# Patient Record
Sex: MALE | Race: WHITE | NOT HISPANIC OR LATINO | Employment: FULL TIME | URBAN - METROPOLITAN AREA
[De-identification: names, ages, dates, MRNs, and addresses within clinical notes are randomized per-mention and may not be internally consistent; named-entity substitution may affect disease eponyms.]

---

## 2021-12-02 ENCOUNTER — OFFICE VISIT (OUTPATIENT)
Dept: URGENT CARE | Facility: CLINIC | Age: 31
End: 2021-12-02
Payer: COMMERCIAL

## 2021-12-02 ENCOUNTER — OCCMED (OUTPATIENT)
Dept: URGENT CARE | Facility: CLINIC | Age: 31
End: 2021-12-02

## 2021-12-02 VITALS
DIASTOLIC BLOOD PRESSURE: 79 MMHG | HEART RATE: 81 BPM | BODY MASS INDEX: 20.32 KG/M2 | HEIGHT: 72 IN | OXYGEN SATURATION: 98 % | TEMPERATURE: 100.1 F | SYSTOLIC BLOOD PRESSURE: 129 MMHG | RESPIRATION RATE: 18 BRPM | WEIGHT: 150 LBS

## 2021-12-02 DIAGNOSIS — H60.391 OTHER INFECTIVE ACUTE OTITIS EXTERNA OF RIGHT EAR: ICD-10-CM
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
## 2021-12-02 DIAGNOSIS — R68.89 FLU-LIKE SYMPTOMS: Primary | ICD-10-CM

## 2021-12-02 DIAGNOSIS — S06.0X0A CONCUSSION WITHOUT LOSS OF CONSCIOUSNESS, INITIAL ENCOUNTER: ICD-10-CM

## 2021-12-02 DIAGNOSIS — H61.21 IMPACTED CERUMEN OF RIGHT EAR: ICD-10-CM

## 2021-12-02 PROCEDURE — S9083 URGENT CARE CENTER GLOBAL: HCPCS | Performed by: PHYSICIAN ASSISTANT

## 2021-12-02 PROCEDURE — 0241U HB NFCT DS VIR RESP RNA 4 TRGT: CPT | Performed by: PHYSICIAN ASSISTANT

## 2021-12-02 PROCEDURE — G0383 LEV 4 HOSP TYPE B ED VISIT: HCPCS | Performed by: PHYSICIAN ASSISTANT

## 2021-12-02 PROCEDURE — 99213 OFFICE O/P EST LOW 20 MIN: CPT | Performed by: PHYSICIAN ASSISTANT

## 2021-12-02 PROCEDURE — 69209 REMOVE IMPACTED EAR WAX UNI: CPT | Performed by: PHYSICIAN ASSISTANT

## 2021-12-02 RX ORDER — OFLOXACIN 3 MG/ML
10 SOLUTION AURICULAR (OTIC) DAILY
Qty: 5 ML | Refills: 0 | Status: SHIPPED | OUTPATIENT
Start: 2021-12-02 | End: 2021-12-09

## 2021-12-03 LAB
FLUAV RNA RESP QL NAA+PROBE: NEGATIVE
FLUBV RNA RESP QL NAA+PROBE: NEGATIVE
RSV RNA RESP QL NAA+PROBE: NEGATIVE
SARS-COV-2 RNA RESP QL NAA+PROBE: POSITIVE

## 2021-12-07 ENCOUNTER — TELEPHONE (OUTPATIENT)
Dept: URGENT CARE | Facility: CLINIC | Age: 31
End: 2021-12-07

## 2022-08-23 ENCOUNTER — OFFICE VISIT (OUTPATIENT)
Dept: URGENT CARE | Facility: CLINIC | Age: 32
End: 2022-08-23
Payer: COMMERCIAL

## 2022-08-23 VITALS
WEIGHT: 162 LBS | SYSTOLIC BLOOD PRESSURE: 115 MMHG | OXYGEN SATURATION: 99 % | BODY MASS INDEX: 21.94 KG/M2 | HEIGHT: 72 IN | HEART RATE: 80 BPM | DIASTOLIC BLOOD PRESSURE: 73 MMHG | RESPIRATION RATE: 16 BRPM | TEMPERATURE: 97.8 F

## 2022-08-23 DIAGNOSIS — Z77.29 EXPOSURE TO RODENT: ICD-10-CM

## 2022-08-23 DIAGNOSIS — R11.11 VOMITING WITHOUT NAUSEA, UNSPECIFIED VOMITING TYPE: ICD-10-CM

## 2022-08-23 DIAGNOSIS — L84 CORN OF FOOT: ICD-10-CM

## 2022-08-23 DIAGNOSIS — B34.9 VIRAL SYNDROME: Primary | ICD-10-CM

## 2022-08-23 LAB
SARS-COV-2 AG UPPER RESP QL IA: NEGATIVE
VALID CONTROL: NORMAL

## 2022-08-23 PROCEDURE — 99213 OFFICE O/P EST LOW 20 MIN: CPT | Performed by: PHYSICIAN ASSISTANT

## 2022-08-23 PROCEDURE — 87811 SARS-COV-2 COVID19 W/OPTIC: CPT | Performed by: PHYSICIAN ASSISTANT

## 2022-08-23 NOTE — PROGRESS NOTES
[facility]        NAME: Sarah Peng is a 28 y o  male  : 1990    MRN: [id number]  DATE: 2022  TIME: 3:14 PM    Assessment and Plan   Viral syndrome [B34 9]  1  Viral syndrome  Poct Covid 19 Rapid Antigen Test   2  Vomiting without nausea, unspecified vomiting type     3  Exposure to rodent     4  [place] of foot  Ambulatory Referral to Podiatry         Patient Instructions   Drink plenty of fluids  Tylenol ibuprofen  Over-the-counter cold and flu medications  Go directly to the ER if symptoms worsen  Make an appoint with PCP  Make appoint with Podiatry  Follow up with PCP in 3-5 days  Proceed to  ER if symptoms worsen  Chief Complaint     Chief Complaint   Patient presents with    Cough     Has a cough x 2 days and has some sob  Worried that he got a virus from a mouse because he got mouse poop in left eye 1 week ago         History of Present Illness       Patient is a 62-year-old male with no significant past medical history presents the office complaining of fatigue, body aches, cough, and dyspnea on exertion for a few days  Denies fevers, chills, nasal congestion, sore throat, chest pain, abdominal pain, or rashes  States he was cleaning and mouse feces dropped into his eye last week  Reports he did research online about hand to virus and is concerned that he now has this  He went to the ER same day but did not have symptoms with a sent him home  They told him to use topical antibiotic eye ointment which she used for 2 days only  Patient admits to vomiting but states this is been a normal occurrence for him for approximately 3 years  States he usually vomits once in the morning a soon as he stands up  States does not matter what time it is but occurs once he sits upright  States he also vomit when he stands up after lying on the couch for a long period of time    Denies associated headache, dizziness, diaphoresis, vision changes, chest pain, shortness breast, or numbness and tingling  Admits he drank heavily for a long period time but has not done so in a few months  Patient does smoke and use marijuana occasionally  He has never been seen for this before  Patient is also complaining of hard lump between digits 4 and 5 on his right foot for long time  He tried putting a Band-Aid around his toe which did help for a while but now it is not helping  Review of Systems   Review of Systems   Constitutional: Negative for chills and fever  HENT: Positive for postnasal drip  Negative for congestion, sore throat and trouble swallowing  Eyes: Negative for photophobia, pain, discharge, redness, itching and visual disturbance  Respiratory: Positive for cough and shortness of breath  Negative for wheezing  Cardiovascular: Negative for chest pain and palpitations  Gastrointestinal: Positive for nausea and vomiting  Negative for abdominal pain and diarrhea  Musculoskeletal: Positive for myalgias  Skin: Negative for rash  Neurological: Positive for headaches  Negative for dizziness and light-headedness  Current Medications     No current outpatient medications on file  Current Allergies     Allergies as of 08/23/2022 - Reviewed 08/23/2022   Allergen Reaction Noted    Neomycin Other (See Comments) 06/19/2013            The following portions of the patient's history were reviewed and updated as appropriate: allergies, current medications, past family history, past medical history, past social history, past surgical history and problem list      Past Medical History:   Diagnosis Date    Neck fracture ([facility] [address] )        Past Surgical History:   Procedure Laterality Date    NO PAST SURGERIES         Family History   Problem Relation Age of Onset    No Known Problems Mother     No Known Problems Father          Medications have been verified          Objective   /73   Pulse 80   Temp 97 8 °F (36 6 °C)   Resp 16   Ht 6' (1 829 m)   Wt 73 5 kg (162 lb)   SpO2 99%   BMI 21 97 kg/m²   No LMP for male patient  Physical Exam     Physical Exam  Vitals and nursing note reviewed  Constitutional:       Appearance: Normal appearance  He is well-developed  He is not ill-appearing  Comments: Patient resting comfortably on examination table no acute distress  Speaking full sentences without difficulty  HENT:      Head: Normocephalic and atraumatic  Right Ear: Tympanic membrane, ear canal and external ear normal       Left Ear: Tympanic membrane, ear canal and external ear normal       Nose: Nose normal       Mouth/Throat:      Pharynx: Uvula midline  Eyes:      General: Lids are normal       Conjunctiva/sclera: Conjunctivae normal       Pupils: Pupils are equal, round, and reactive to light  Cardiovascular:      Rate and Rhythm: Normal rate and regular rhythm  Heart sounds: Normal heart sounds  No murmur heard  No friction rub  No gallop  Pulmonary:      Effort: Pulmonary effort is normal       Breath sounds: Normal breath sounds  No stridor  No wheezing or rales  Musculoskeletal:         General: Normal range of motion  Cervical back: Neck supple  Skin:     General: Skin is warm and dry  Capillary Refill: Capillary refill takes less than 2 seconds  Comments: 0 5 cm round area of hyperkeritinzed skin on 4th digit of right foot  TTP  Neurological:      Mental Status: He is alert         POC rapid COVID-19 negative

## 2023-03-28 ENCOUNTER — OFFICE VISIT (OUTPATIENT)
Dept: URGENT CARE | Facility: CLINIC | Age: 33
End: 2023-03-28

## 2023-03-28 VITALS
SYSTOLIC BLOOD PRESSURE: 106 MMHG | BODY MASS INDEX: 20.99 KG/M2 | WEIGHT: 155 LBS | TEMPERATURE: 97.8 F | OXYGEN SATURATION: 99 % | HEIGHT: 72 IN | HEART RATE: 69 BPM | RESPIRATION RATE: 18 BRPM | DIASTOLIC BLOOD PRESSURE: 70 MMHG

## 2023-03-28 DIAGNOSIS — K64.9 HEMORRHOIDS, UNSPECIFIED HEMORRHOID TYPE: Primary | ICD-10-CM

## 2023-03-28 NOTE — LETTER
March 28, 2023     Patient: Kathryn Gabriel   YOB: 1990   Date of Visit: 3/28/2023       To Whom it May Concern:    Kathryn Gabriel was seen in my clinic on 3/28/2023  He may return to work on 3/30/2023  Please excuse any missed time due to injury  If you have any questions or concerns, please don't hesitate to call           Sincerely,          STEPHANIE Tsai        CC: No Recipients

## 2023-03-29 NOTE — PATIENT INSTRUCTIONS
Use medication as prescribed  Warm water soaks and baths  Avoid straining during bowel movements   OTC Tylenol or Ibuprofen for pain   Referral placed to Colorectal Surgery for removal   Follow up with PCP in 3-5 days  Proceed to ER if symptoms worsen  Hemorrhoids   AMBULATORY CARE:   Hemorrhoids  are swollen blood vessels inside your rectum (internal hemorrhoids) or on your anus (external hemorrhoids)  Sometimes a hemorrhoid may prolapse  This means it extends out of your anus  Common symptoms include the following:   Pain or itching around your anus or inside your rectum    Swelling or bumps around your anus    Bright red blood in your bowel movement, on the toilet paper, or in the toilet bowl    Tissue bulging out of your anus (prolapsed hemorrhoids)    Incontinence (poor control over urine or bowel movements)    Seek care immediately if:   You have severe pain in your rectum or around your anus  You have severe pain in your abdomen and you are vomiting  You have bleeding from your anus that soaks through your underwear  Contact your healthcare provider if:   You have frequent and painful bowel movements  Your hemorrhoid looks or feels more swollen than usual      You do not have a bowel movement for 2 days or more  You see or feel tissue coming through your anus  You have questions or concerns about your condition or care  Treatment for hemorrhoids  may include medicines to decrease pain, swelling, and itching  The medicine may be a pill, pad, cream, or ointment  Medicine may also be given to soften your bowel movement  Surgery and other procedures may be needed to shrink or remove your hemorrhoids  Manage your symptoms:   Apply ice on your anus for 15 to 20 minutes every hour or as directed  Use an ice pack, or put crushed ice in a plastic bag  Cover it with a towel before you apply it to your anus  Ice helps prevent tissue damage and decreases swelling and pain      Take a sitz Sherlynn Belt a bathtub with 4 to 6 inches of warm water  You may also use a sitz bath pan that fits inside a toilet bowl  Sit in the sitz bath for 15 minutes  Do this 3 times a day, and after each bowel movement  The warm water can help decrease pain and swelling  Keep your anal area clean  Gently wash the area with warm water daily  Soap may irritate the area  After a bowel movement, wipe with moist towelettes or wet toilet paper  Dry toilet paper can irritate the area  Prevent hemorrhoids:   Do not strain to have a bowel movement  Do not sit on the toilet too long  These actions can increase pressure on the tissues in your rectum and anus  Drink plenty of liquids  Liquids can help prevent constipation  Ask how much liquid to drink each day and which liquids are best for you  Eat a variety of high-fiber foods  Examples include fruits, vegetables, and whole grains  Ask your healthcare provider how much fiber you need each day  You may need to take a fiber supplement  Exercise as directed  Exercise, such as walking, may make it easier to have a bowel movement  Ask your healthcare provider to help you create an exercise plan  Do not have anal sex  Anal sex can weaken the skin around your rectum and anus  Avoid heavy lifting  This can cause straining and increase your risk for another hemorrhoid  Follow up with your doctor as directed:  Write down your questions so you remember to ask them during your visits  © Copyright [employer] 2022 Information is for End User's use only and may not be sold, redistributed or otherwise used for commercial purposes  The above information is an  only  It is not intended as medical advice for individual conditions or treatments  Talk to your doctor, nurse or pharmacist before following any medical regimen to see if it is safe and effective for you

## 2023-03-29 NOTE — PROGRESS NOTES
[facility]        NAME: Mima Poster is a 33 y o  male  : 1990    MRN: [id number]  DATE: 2023  TIME: 8:17 PM    Assessment and Plan   Hemorrhoids, unspecified hemorrhoid type [K64 9]  1  Hemorrhoids, unspecified hemorrhoid type  hydrocortisone-pramoxine (PROCTOFOAM-HC) 1-1 % FOAM rectal foam    Ambulatory Referral to Colorectal Surgery        Rectum observed with chaperone present and patient permission, external hemorrhoid visualized  Improved from photos on cell phone 2 days prior  Proctofoam prescribed and referral placed to colorectal surgery  Encouraged continued supportive measures  Follow up with PCP in 3-5 days or proceed to emergency department for worsening symptoms  Patient verbalized understanding of instructions given  Patient Instructions     Patient Instructions     Use medication as prescribed  Warm water soaks and baths  Avoid straining during bowel movements   OTC Tylenol or Ibuprofen for pain   Referral placed to Colorectal Surgery for removal   Follow up with PCP in 3-5 days  Proceed to ER if symptoms worsen  Hemorrhoids   AMBULATORY CARE:   Hemorrhoids  are swollen blood vessels inside your rectum (internal hemorrhoids) or on your anus (external hemorrhoids)  Sometimes a hemorrhoid may prolapse  This means it extends out of your anus  Common symptoms include the following:   • Pain or itching around your anus or inside your rectum    • Swelling or bumps around your anus    • Bright red blood in your bowel movement, on the toilet paper, or in the toilet bowl    • Tissue bulging out of your anus (prolapsed hemorrhoids)    • Incontinence (poor control over urine or bowel movements)    Seek care immediately if:   • You have severe pain in your rectum or around your anus  • You have severe pain in your abdomen and you are vomiting  • You have bleeding from your anus that soaks through your underwear      Contact your healthcare provider if:   • You have frequent and painful bowel movements  • Your hemorrhoid looks or feels more swollen than usual      • You do not have a bowel movement for 2 days or more  • You see or feel tissue coming through your anus  • You have questions or concerns about your condition or care  Treatment for hemorrhoids  may include medicines to decrease pain, swelling, and itching  The medicine may be a pill, pad, cream, or ointment  Medicine may also be given to soften your bowel movement  Surgery and other procedures may be needed to shrink or remove your hemorrhoids  Manage your symptoms:   • Apply ice on your anus for 15 to 20 minutes every hour or as directed  Use an ice pack, or put crushed ice in a plastic bag  Cover it with a towel before you apply it to your anus  Ice helps prevent tissue damage and decreases swelling and pain  • Take a sitz bath  Fill a bathtub with 4 to 6 inches of warm water  You may also use a sitz bath pan that fits inside a toilet bowl  Sit in the sitz bath for 15 minutes  Do this 3 times a day, and after each bowel movement  The warm water can help decrease pain and swelling  • Keep your anal area clean  Gently wash the area with warm water daily  Soap may irritate the area  After a bowel movement, wipe with moist towelettes or wet toilet paper  Dry toilet paper can irritate the area  Prevent hemorrhoids:   • Do not strain to have a bowel movement  Do not sit on the toilet too long  These actions can increase pressure on the tissues in your rectum and anus  • Drink plenty of liquids  Liquids can help prevent constipation  Ask how much liquid to drink each day and which liquids are best for you  • Eat a variety of high-fiber foods  Examples include fruits, vegetables, and whole grains  Ask your healthcare provider how much fiber you need each day  You may need to take a fiber supplement  • Exercise as directed    Exercise, such as walking, may make it easier to have a bowel movement  Ask your healthcare provider to help you create an exercise plan  • Do not have anal sex  Anal sex can weaken the skin around your rectum and anus  • Avoid heavy lifting  This can cause straining and increase your risk for another hemorrhoid  Follow up with your doctor as directed:  Write down your questions so you remember to ask them during your visits  © Copyright [employer] 2022 Information is for End User's use only and may not be sold, redistributed or otherwise used for commercial purposes  The above information is an  only  It is not intended as medical advice for individual conditions or treatments  Talk to your doctor, nurse or pharmacist before following any medical regimen to see if it is safe and effective for you  Chief Complaint     Chief Complaint   Patient presents with   • Hemorrhoids     2 days ago he lifted heavy object and developed hemorroid  History of Present Illness       80-year-old male presents with complaints of hemorrhoid x3 days  Patient states that he was lifting a object about 2 days ago when he noticed increased pain and bulging from his rectum  Patient reports history of hemorrhoids in the past, often triggered and exacerbated by heavy lifting  He states he has been applying Preparation H, sugar, and soaking in a warm bath with improvement of his symptoms  Denies any rectal bleeding or urinary symptoms  Last BM today  He states significant decrease in swelling as compared to initial injury  Patient did take photos of his hemorrhoid on his cell phone to show during patient encounter for comparison  Review of Systems   Review of Systems   Constitutional: Negative for fever  Respiratory: Negative for shortness of breath  Cardiovascular: Negative for chest pain  Gastrointestinal: Positive for rectal pain  Negative for abdominal pain, anal bleeding, blood in stool, diarrhea, nausea and vomiting  Hemorrhoids   Genitourinary: Negative for difficulty urinating  Skin: Negative for rash  Current Medications       Current Outpatient Medications:   •  hydrocortisone-pramoxine (PROCTOFOAM-HC) 1-1 % FOAM rectal foam, Insert 1 applicator into the rectum 2 (two) times a day, Disp: 10 g, Rfl: 0    Current Allergies     Allergies as of 03/28/2023 - Reviewed 03/28/2023   Allergen Reaction Noted   • Neomycin Other (See Comments) 06/19/2013            The following portions of the patient's history were reviewed and updated as appropriate: allergies, current medications, past family history, past medical history, past social history, past surgical history and problem list      Past Medical History:   Diagnosis Date   • Neck fracture ([address] )        Past Surgical History:   Procedure Laterality Date   • NO PAST SURGERIES         Family History   Problem Relation Age of Onset   • No Known Problems Mother    • No Known Problems Father          Medications have been verified  Objective   /70   Pulse 69   Temp 97 8 °F (36 6 °C)   Resp 18   Ht 6' (1 829 m)   Wt 70 3 kg (155 lb)   SpO2 99%   BMI 21 02 kg/m²   No LMP for male patient  Physical Exam     Physical Exam  Vitals and nursing note reviewed  Exam conducted with a chaperone present  Constitutional:       General: He is not in acute distress  Appearance: He is not toxic-appearing  HENT:      Head: Normocephalic  Nose: Nose normal       Mouth/Throat:      Mouth: Mucous membranes are moist       Pharynx: Oropharynx is clear  Eyes:      Conjunctiva/sclera: Conjunctivae normal    Cardiovascular:      Rate and Rhythm: Normal rate and regular rhythm  Heart sounds: Normal heart sounds  Pulmonary:      Effort: Pulmonary effort is normal  No respiratory distress  Breath sounds: Normal breath sounds  No stridor  No wheezing, rhonchi or rales     Abdominal:      General: Bowel sounds are normal       Palpations: Abdomen is soft  Tenderness: There is no abdominal tenderness  Genitourinary:     Rectum: External hemorrhoid present  Skin:     General: Skin is warm and dry  Neurological:      Mental Status: He is alert and oriented to person, place, and time  Gait: Gait is intact     Psychiatric:         Mood and Affect: Mood normal          Behavior: Behavior normal

## 2024-11-06 ENCOUNTER — OFFICE VISIT (OUTPATIENT)
Dept: URGENT CARE | Facility: CLINIC | Age: 34
End: 2024-11-06
Payer: COMMERCIAL

## 2024-11-06 VITALS
RESPIRATION RATE: 18 BRPM | HEIGHT: 72 IN | OXYGEN SATURATION: 97 % | WEIGHT: 151.8 LBS | BODY MASS INDEX: 20.56 KG/M2 | DIASTOLIC BLOOD PRESSURE: 73 MMHG | TEMPERATURE: 97.4 F | SYSTOLIC BLOOD PRESSURE: 109 MMHG | HEART RATE: 75 BPM

## 2024-11-06 DIAGNOSIS — M54.50 ACUTE LOW BACK PAIN, UNSPECIFIED BACK PAIN LATERALITY, UNSPECIFIED WHETHER SCIATICA PRESENT: ICD-10-CM

## 2024-11-06 DIAGNOSIS — N50.811 PAIN IN BOTH TESTICLES: Primary | ICD-10-CM

## 2024-11-06 DIAGNOSIS — N50.812 PAIN IN BOTH TESTICLES: Primary | ICD-10-CM

## 2024-11-06 LAB
SL AMB  POCT GLUCOSE, UA: ABNORMAL
SL AMB LEUKOCYTE ESTERASE,UA: ABNORMAL
SL AMB POCT BILIRUBIN,UA: ABNORMAL
SL AMB POCT BLOOD,UA: ABNORMAL
SL AMB POCT CLARITY,UA: CLEAR
SL AMB POCT COLOR,UA: YELLOW
SL AMB POCT KETONES,UA: ABNORMAL
SL AMB POCT NITRITE,UA: ABNORMAL
SL AMB POCT PH,UA: 7
SL AMB POCT SPECIFIC GRAVITY,UA: 1.01
SL AMB POCT URINE PROTEIN: ABNORMAL
SL AMB POCT UROBILINOGEN: 0.2

## 2024-11-06 PROCEDURE — 81002 URINALYSIS NONAUTO W/O SCOPE: CPT | Performed by: PHYSICIAN ASSISTANT

## 2024-11-06 PROCEDURE — G0382 LEV 3 HOSP TYPE B ED VISIT: HCPCS | Performed by: PHYSICIAN ASSISTANT

## 2024-11-06 NOTE — PROGRESS NOTES
[facility]        NAME: Jarett Pereira is a 34 y.o. male  : 1990    MRN: [id number]  DATE: 2024  TIME: 1:29 PM    Assessment and Plan   Pain in both testicles [N50.811, N50.812]  1. Pain in both testicles  POCT urine dip      2. Acute low back pain, unspecified back pain laterality, unspecified whether sciatica present  POCT urine dip          Recommend going to ED today.     Patient Instructions   Patient was told multiple times I recommend going to ED to have testicle pain and swelling further evaluated. Patient understood risk of not going to ED as soon as possible.    Follow up with PCP in 3-5 days.  Proceed to  ER if symptoms worsen.    If tests have been performed at [facility], our office will contact you with results if changes need to be made to the care plan discussed with you at the visit.  [facility]    Chief Complaint     Chief Complaint   Patient presents with    Back Pain     Lower back pain with testicle pain x 4 days          History of Present Illness       Patient is a 34 year old male who presents today complaining of low back pain and B/l testicle pain. Denies any injury or trauma. 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 Admits loose stool and that he vomits a few times a day but this is normal for him. Denies any urinary frequency, urgency or pain with urination. Patient reports testicles hurt to touch. Denies any redness or warmth on B/L testicles. Denies any blood in urine or stool. Admits he was a heavy drinker and stopped drinking on 10/31/24.    Back Pain  Pertinent negatives include no dysuria.       Review of Systems   Review of Systems   Constitutional: Negative.    Respiratory: Negative.     Cardiovascular: Negative.    Genitourinary:  Positive for testicular pain. Negative for dysuria, frequency, hematuria and urgency.   Musculoskeletal:  Positive for back pain.         Current  Medications       Current Outpatient Medications:     hydrocortisone-pramoxine (PROCTOFOAM-HC) 1-1 % FOAM rectal foam, Insert 1 applicator into the rectum 2 (two) times a day (Patient not taking: Reported on 11/6/2024), Disp: 10 g, Rfl: 0    Current Allergies     Allergies as of 11/06/2024 - Reviewed 11/06/2024   Allergen Reaction Noted    Neomycin Other (See Comments) 06/19/2013            The following portions of the patient's history were reviewed and updated as appropriate: allergies, current medications, past family history, past medical history, past social history, past surgical history and problem list.     Past Medical History:   Diagnosis Date    Neck fracture (HCC)        Past Surgical History:   Procedure Laterality Date    NO PAST SURGERIES         Family History   Problem Relation Age of Onset    No Known Problems Mother     No Known Problems Father          Medications have been verified.        Objective   /73   Pulse 75   Temp (!) 97.4 °F (36.3 °C) (Temporal)   Resp 18   Ht 6' (1.829 m)   Wt 68.9 kg (151 lb 12.8 oz)   SpO2 97%   BMI 20.59 kg/m²   No LMP for male patient.       Physical Exam     Physical Exam  Vitals and nursing note reviewed. Exam conducted with a chaperone present.   Constitutional:       Appearance: Normal appearance.   HENT:      Head: Normocephalic.   Cardiovascular:      Rate and Rhythm: Normal rate and regular rhythm.      Heart sounds: Normal heart sounds.   Pulmonary:      Breath sounds: Normal breath sounds.   Abdominal:      Palpations: Abdomen is soft.      Comments: Pain in lower left quadrant   Genitourinary:     Comments: B/L testicle swelling and pain with palpation  Musculoskeletal:      Comments: Lower extremity motor intact with out pain   Neurological:      Mental Status: He is alert and oriented to person, place, and time.   Psychiatric:         Mood and Affect: Mood normal.         Behavior: Behavior normal.

## 2024-11-06 NOTE — PROGRESS NOTES
[facility]        NAME: Jarett Pereira is a 34 y.o. male  : 1990    MRN: [id number]  DATE: 2024  TIME: 1:16 PM    There were no vitals taken for this visit.    Assessment and Plan   No primary diagnosis found.  No diagnosis found.      Patient Instructions       Follow up with PCP in 3-5 days.  Proceed to  ER if symptoms worsen.    Chief Complaint     Chief Complaint   Patient presents with    Back Pain     Lower back pain with testicle pain x 4 days          History of Present Illness       HPI    Review of Systems   Review of Systems      Current Medications       Current Outpatient Medications:     hydrocortisone-pramoxine (PROCTOFOAM-HC) 1-1 % FOAM rectal foam, Insert 1 applicator into the rectum 2 (two) times a day (Patient not taking: Reported on 2024), Disp: 10 g, Rfl: 0    Current Allergies     Allergies as of 2024 - Reviewed 2024   Allergen Reaction Noted    Neomycin Other (See Comments) 2013            The following portions of the patient's history were reviewed and updated as appropriate: allergies, current medications, past family history, past medical history, past social history, past surgical history and problem list.     Past Medical History:   Diagnosis Date    Neck fracture (HCC)        Past Surgical History:   Procedure Laterality Date    NO PAST SURGERIES         Family History   Problem Relation Age of Onset    No Known Problems Mother     No Known Problems Father          Medications have been verified.        Objective   There were no vitals taken for this visit.       Physical Exam     Physical Exam